# Patient Record
Sex: FEMALE | URBAN - METROPOLITAN AREA
[De-identification: names, ages, dates, MRNs, and addresses within clinical notes are randomized per-mention and may not be internally consistent; named-entity substitution may affect disease eponyms.]

---

## 2022-01-01 ENCOUNTER — EMERGENCY (EMERGENCY)
Facility: HOSPITAL | Age: 72
LOS: 1 days | End: 2022-01-01
Attending: EMERGENCY MEDICINE
Payer: SELF-PAY

## 2022-01-01 LAB
RAPID RVP RESULT: SIGNIFICANT CHANGE UP
SARS-COV-2 RNA SPEC QL NAA+PROBE: SIGNIFICANT CHANGE UP

## 2022-01-01 PROCEDURE — 36556 INSERT NON-TUNNEL CV CATH: CPT

## 2022-01-01 PROCEDURE — 99291 CRITICAL CARE FIRST HOUR: CPT

## 2022-01-01 PROCEDURE — 36680 INSERT NEEDLE BONE CAVITY: CPT

## 2022-01-01 PROCEDURE — 76937 US GUIDE VASCULAR ACCESS: CPT | Mod: 26

## 2022-01-01 PROCEDURE — 82962 GLUCOSE BLOOD TEST: CPT

## 2022-01-01 PROCEDURE — 99285 EMERGENCY DEPT VISIT HI MDM: CPT | Mod: 25

## 2022-01-01 PROCEDURE — C1751: CPT

## 2022-01-01 PROCEDURE — 92950 HEART/LUNG RESUSCITATION CPR: CPT

## 2022-01-01 PROCEDURE — 0225U NFCT DS DNA&RNA 21 SARSCOV2: CPT

## 2022-04-19 NOTE — ED PROCEDURE NOTE - CPROC ED TIME OUT STATEMENT1
“Patient's name, , procedure and correct site were confirmed during the [place] Timeout.”
“Patient's name, , procedure and correct site were confirmed during the [place] Timeout.”

## 2022-04-19 NOTE — ED ADULT TRIAGE NOTE - CHIEF COMPLAINT QUOTE
BIBEMS in cardiac arrest.  witnessed arrest by EMS.  Initial call was for difficulty breathing. Missed dialysis today. 2Epis and 6 shocks delivered.  On arrival to Ed found to be in pulseless Vtach.

## 2022-04-19 NOTE — ED ADULT NURSE NOTE - OBJECTIVE STATEMENT
BIBEMS in cardiac arrest. Per EMS, 911 activated for Respiratory distress, pt bradycardic upon transpo to Hospital, 2 epis admin w/ EMS, defib x6 PTA. Refer to ED code flowsheet for further interventions. TOD 2055 by MD Steele.

## 2022-04-19 NOTE — ED PROVIDER NOTE - ENMT, MLM
Airway patent, ETT in place, Nasal mucosa clear. Mouth with normal mucosa. Throat has no vesicles, no oropharyngeal exudates and uvula is midline.

## 2022-04-19 NOTE — ED PROVIDER NOTE - ATTENDING CONTRIBUTION TO CARE
I, Sander Steele, performed a face to face bedside interview with this patient regarding history of present illness, review of symptoms and relevant past medical, social and family history.  I completed an independent physical examination. I have communicated the patient’s plan of care and disposition with the resident.  72 year old female with PMH CAD s/p CABG, ESRD on HD, DM presents as a cardiac arrest. Pt missed dialysis today. EMS was called, they noticed the pt to be dyspnic, moved her onto the EMS stretcher, and she subsequently became bradycardic and went into a cardiac arrest, Vfib then Vtach. Pt shocked 6 times in the field and given 2 epi. Pt arrived in in cardiac arrest, initially PEA, given calcium and bicarb, and then ROSC obtained. While pt still had a pulse, noted to have wide complex, given additional calcium and bicarb, then went into Vtach with a pulse, cardioversion attempted, then pt into Vfib. Pt was in refractory Vfib arrest, total of 300 mg and then 150 mg amio, calcium chloride x 5, bicarb x 4, mag 2 g x 2, and a total of 7 defibrillations (the last 3 of which were dual sequence). Pt pronounced dead at 2055  Gen: unresponsive  CV: no heart sounds auscultated  Pul: CTA b/l with bagging but no spontaneous breath sounds  Abd: Soft, non-distended  Neuro: unresponsive

## 2022-04-19 NOTE — ED PROVIDER NOTE - CLINICAL SUMMARY MEDICAL DECISION MAKING FREE TEXT BOX
Pt with refractory Vfib arrest, pronounced dead at 2055 72 year old female with PMH CAD s/p CABG, ESRD on HD, DM presents as a cardiac arrest. Pt missed dialysis today. EMS was called, they noticed the pt to be dyspnic, moved her onto the EMS stretcher, and she subsequently became bradycardic and went into a cardiac arrest, Vfib then Vtach. Pt shocked 6 times in the field and given 2 epi. Pt arrived in in cardiac arrest, initially PEA, given calcium and bicarb, and then ROSC obtained. While pt still had a pulse, noted to have wide complex, given additional calcium and bicarb, then went into Vtach with a pulse, cardioversion attempted, then pt into Vfib. Pt was in refractory Vfib arrest, total of 300 mg and then 150 mg amio, calcium chloride x 5, bicarb x 4, mag 2 g x 2, and a total of 7 defibrillations (the last 3 of which were dual sequence). Pt pronounced dead at 2055

## 2022-04-19 NOTE — ED PROVIDER NOTE - CRITICAL CARE ATTENDING CONTRIBUTION TO CARE
72 year old female with PMH CAD s/p CABG, ESRD on HD, DM presents as a cardiac arrest. Pt missed dialysis today. EMS was called, they noticed the pt to be dyspnic, moved her onto the EMS stretcher, and she subsequently became bradycardic and went into a cardiac arrest, Vfib then Vtach. Pt shocked 6 times in the field and given 2 epi. Pt arrived in in cardiac arrest, initially PEA, given calcium and bicarb, and then ROSC obtained. While pt still had a pulse, noted to have wide complex, given additional calcium and bicarb, then went into Vtach with a pulse, cardioversion attempted, then pt into Vfib. Pt was in refractory Vfib arrest, total of 300 mg and then 150 mg amio, calcium chloride x 5, bicarb x 4, mag 2 g x 2, and a total of 7 defibrillations (the last 3 of which were dual sequence). Pt pronounced dead at 2055

## 2022-04-19 NOTE — ED PROVIDER NOTE - OBJECTIVE STATEMENT
75 y/o female with PMHx of ESRD on dialysis missed today presented to ED in cardiac arrest. EMS reports patient initially called 9-1-1 for respiratory distress, patient had an initial HR of 135, and BP of 130/80. While in the ambulance, patient natali down into the 40's, and went into cardiac arrest at 2010, which was witnessed by EMS. EMS intubated the patient with a 7.5 ET tube, was initially in V-fib, shocked twice, and then received another 4 shocks in V-tach. EMS gave 2 epi's PTA in ED. In ED, ROSC was achieved at 2032, and pulses lost at 2039. 75 y/o female with PMHx of ESRD on dialysis missed today, DM, HTN s/p triple bypass presented to ED in cardiac arrest. EMS reports patient initially called 9-1-1 for respiratory distress, patient had an initial HR of 135, and BP of 130/80. While in the ambulance, patient natali down into the 40's, and went into cardiac arrest at 2010, which was witnessed by EMS. EMS intubated the patient with a 7.5 ET tube, was initially in V-fib, shocked twice, and then received another 4 shocks in V-tach. EMS gave 2 epi's PTA in ED. In ED, ROSC was achieved at 2032, and pulses lost at 2039. TOD: 2055

## 2022-04-19 NOTE — ED PROCEDURE NOTE - CPROC ED INFUS LINE DETAIL1
The location was identified, and the area was draped and prepped./The catheter was placed using sterile technique./Ultrasound guidance was used./The guidewire was recovered./All lumen(s) aspirated and flushed without difficulty.
The location was identified, and the area was draped and prepped.

## 2022-04-19 NOTE — ED PROVIDER NOTE - PROGRESS NOTE DETAILS
Mitch PADGETT for ED attending, Dr. Steele. Patient family member: [phone] Mitch PADGETT for ED attending, Dr. Steele. Nidia Staples 1950

## 2022-04-19 NOTE — ED PROCEDURE NOTE - CPROC ED SITE PREP1
Patient's intermittent insomnia is well controlled with Ambien  Refill given today  The PDMP was queried and no signs of abuse or misuse were noted  chlorhexidine